# Patient Record
(demographics unavailable — no encounter records)

---

## 2025-07-10 NOTE — HISTORY OF PRESENT ILLNESS
[FreeTextEntry1] : neck pain [de-identified] : Ms. BRONSON ALEJO  is a 76 year F presenting with a PMHx osteoporosis who presents for comprehensive neuro-surgical evaluation of neck pain. Reports atraumatic neck pain for a year without radiculopathy and paresthesia. She has been undergoing injections x2-3 with Dr. Lomeli with no pain relief. she states the injections make her pain worse. Did PT for 8 months, but states she was discharged as she was told she was not improving. She followed up with her PCP who referred her for more PT. Occasional balance problems. No weakness. Has stiffness and reduced ROM. She is no longer taking medication for pain as she states it was not helping. Reports urinary incontinence for several years. She consulted with a urologist, but did not follow up as she states she did not find him helpful. Denies saddle anesthesia and bowel incontinence.  Negative

## 2025-07-10 NOTE — ASSESSMENT
[FreeTextEntry1] : 76 year F with neck and lower back pain. Reports atraumatic neck pain for a year without radiculopathy and paresthesia. She has been undergoing injections x2-3 with Dr. Lomeli with no pain relief. she states the injections make her pain worse. Did PT for 8 months, but states she was discharged as she was told she was not improving. She followed up with her PCP who referred her for more PT. Occasional balance problems. No weakness. Has stiffness and reduced ROM. She is no longer taking medication for pain as she states it was not helping. Reports urinary incontinence for several years. She consulted with a urologist, but did not follow up as she states she did not find him helpful. Denies saddle anesthesia and bowel incontinence.     ****NOTE IS INCOMPLETE, CHART PREP, WILL BE COMPLETED ONCE PATIENT IS SEEN/EVALUATED****

## 2025-07-10 NOTE — HISTORY OF PRESENT ILLNESS
[FreeTextEntry1] : neck pain [de-identified] : Ms. BRONSON ALEJO  is a 76 year F presenting with a PMHx osteoporosis who presents for comprehensive neuro-surgical evaluation of neck pain. Reports atraumatic neck pain for a year without radiculopathy and paresthesia. She has been undergoing injections x2-3 with Dr. Lomeli with no pain relief. she states the injections make her pain worse. Did PT for 8 months, but states she was discharged as she was told she was not improving. She followed up with her PCP who referred her for more PT. Occasional balance problems. No weakness. Has stiffness and reduced ROM. She is no longer taking medication for pain as she states it was not helping. Reports urinary incontinence for several years. She consulted with a urologist, but did not follow up as she states she did not find him helpful. Denies saddle anesthesia and bowel incontinence.

## 2025-07-10 NOTE — REVIEW OF SYSTEMS
[Negative] : Heme/Lymph [As Noted in HPI] : as noted in HPI [FreeTextEntry9] : occasional right shoulder pain

## 2025-07-10 NOTE — HISTORY OF PRESENT ILLNESS
[FreeTextEntry1] : neck pain [de-identified] : Ms. BRONSON ALEJO  is a 76 year F presenting with a PMHx osteoporosis who presents for comprehensive neuro-surgical evaluation of neck pain. Reports atraumatic neck pain for a year without radiculopathy and paresthesia. She has been undergoing injections x2-3 with Dr. Lomeli with no pain relief. she states the injections make her pain worse. Did PT for 8 months, but states she was discharged as she was told she was not improving. She followed up with her PCP who referred her for more PT. Occasional balance problems. No weakness. Has stiffness and reduced ROM. She is no longer taking medication for pain as she states it was not helping. Reports urinary incontinence for several years. She consulted with a urologist, but did not follow up as she states she did not find him helpful. Denies saddle anesthesia and bowel incontinence.

## 2025-07-10 NOTE — HISTORY OF PRESENT ILLNESS
[FreeTextEntry1] : neck pain [de-identified] : Ms. BRONSON ALEJO  is a 76 year F presenting with a PMHx osteoporosis who presents for comprehensive neuro-surgical evaluation of neck pain. Reports atraumatic neck pain for a year without radiculopathy and paresthesia. She has been undergoing injections x2-3 with Dr. Lomeil with no pain relief. she states the injections make her pain worse. Did PT for 8 months, but states she was discharged as she was told she was not improving. She followed up with her PCP who referred her for more PT. Occasional balance problems. No weakness. Has stiffness and reduced ROM. She is no longer taking medication for pain as she states it was not helping. Reports urinary incontinence for several years. She consulted with a urologist, but did not follow up as she states she did not find him helpful. Denies saddle anesthesia and bowel incontinence.

## 2025-07-10 NOTE — PHYSICAL EXAM
[General Appearance - Alert] : alert [General Appearance - In No Acute Distress] : in no acute distress [Oriented To Time, Place, And Person] : oriented to person, place, and time [Person] : oriented to person [Place] : oriented to place [Time] : oriented to time [Sclera] : the sclera and conjunctiva were normal [Outer Ear] : the ears and nose were normal in appearance [Neck Appearance] : the appearance of the neck was normal [] : no respiratory distress [Skin Color & Pigmentation] : normal skin color and pigmentation [Motor Tone] : muscle tone was normal in all four extremities [Motor Strength] : muscle strength was normal in all four extremities [Involuntary Movements] : no involuntary movements were seen [Sensation Tactile Decrease] : light touch was intact [Abnormal Walk] : normal gait [Balance] : balance was intact [2+] : Patella left 2+ [Spurling's - Opposite Side] : Positive Spurling's on opposite side [Spurling's Same Side] : Positive Spurling's on same side [No Visual Abnormalities] : no visible abnormailities [Able to toe walk] : the patient was able to toe walk [Straight-Leg Raise Test - Left] : straight leg raise of the left leg was negative [Straight-Leg Raise Test - Right] : straight leg raise  of the right leg was negative [Able to heel walk] : the patient was not able to heel walk